# Patient Record
(demographics unavailable — no encounter records)

---

## 2024-10-21 NOTE — HEALTH RISK ASSESSMENT
[Good] : ~his/her~  mood as  good [0] : 2) Feeling down, depressed, or hopeless: Not at all (0) [PHQ-2 Negative - No further assessment needed] : PHQ-2 Negative - No further assessment needed [[id number]] : 0 [Former] : Former [0-4] : 0-4 [> 15 Years] : > 15 Years [Change in mental status noted] : No change in mental status noted

## 2024-10-21 NOTE — ASSESSMENT
[FreeTextEntry1] : thyroid nodules: She follows with Dr Beach.  HCM: Discussed mammogram for breast ca screening. FIT-DNA for colon ca screening. Check labs. Recommended shingrix at the pharmacy.  HTN: BP controlled.on losartan-HCTz.

## 2024-10-28 NOTE — HISTORY OF PRESENT ILLNESS
[de-identified] : 54 yr old female, PCP told her she has wax +hx CI  had a bad sinus infection 6 months ago  wants to make sure she's ok +seasonal allergy

## 2024-10-28 NOTE — PHYSICAL EXAM
[de-identified] : CI AU [de-identified] : pale mucous [Normal] : mucosa is normal [Midline] : trachea located in midline position

## 2024-11-05 NOTE — ASSESSMENT
[FreeTextEntry1] : Reviewed and reconciled medications, allergies, PMHx, PSHx, SocHx, FMHx.   physical exam: right ear: large blood clot/scab inferior portion of ear canal.  suctioned clot clear than use peroxide to further clean out the ear.      Plan: Neomycin-Polymyxin ear drops for next 10 days. Allergic to ofloxacin/ciprodex Follow up in 2 weeks     Case discussed with Dr. Reyes

## 2024-11-05 NOTE — PHYSICAL EXAM
[Normal] : normal appearance, well groomed, well nourished, and in no acute distress [FreeTextEntry8] : large blood clot/scab inferior portion of ear canal

## 2024-11-05 NOTE — CONSULT LETTER
[Dear  ___] : Dear  [unfilled], [Courtesy Letter:] : I had the pleasure of seeing your patient, [unfilled], in my office today. [Please see my note below.] : Please see my note below. [Referral Closing:] : Thank you very much for seeing this patient.  If you have any questions, please do not hesitate to contact me. [Sincerely,] : Sincerely, [FreeTextEntry3] : Trenton Fleming PA-C

## 2024-11-05 NOTE — HISTORY OF PRESENT ILLNESS
[de-identified] : Patient presents as a follow up, patient seen by Dr. Cunningham last week for bilateral cerumen impaction and starting a couple days later from that visit patient had some otalgia and continued clogged ear sensation. She has noticed decreased hearing from the right ear and individuals have noted that she is always asking to repeat themselves. She used ear drops that she had at home without significant relief of her symptoms.

## 2024-11-18 NOTE — ASSESSMENT
[FreeTextEntry1] : Reviewed and reconciled medications, allergies, PMHx, PSHx, SocHx, FMHx.   physical exam: right ear: large blood clot/scab inferior portion of ear canal was removed during this visit. Looks well healed.  Left side of the nose looks like a specific spot which is causing bleeding. Discussed with patient using mupirocin ointment at night for next several days vs cauterization today. Patient opted for mupirocin.     Plan: Mupirocin ointment to lubricate the nose.  Follow up in 6 months with Dr. Cunningham     Case discussed with Dr. Reyes

## 2024-11-18 NOTE — PHYSICAL EXAM
[Normal] : the left mastoid was normal [Hearing Loss Right Only] : normal [Hearing Loss Left Only] : normal [FreeTextEntry8] : large blood clot/scab inferior portion of ear canal

## 2024-11-18 NOTE — HISTORY OF PRESENT ILLNESS
[de-identified] : Patient presents as a follow up, patient seen by Dr. Cunningham last week for bilateral cerumen impaction and starting a couple days later from that visit patient had some otalgia and continued clogged ear sensation. She has noticed decreased hearing from the right ear and individuals have noted that she is always asking to repeat themselves. She used ear drops that she had at home without significant relief of her symptoms.  [FreeTextEntry1] : Patient presents today as follow up to her ear bleeding. States used the drops as instructed. Hasnt noticed further bleeding. She notes blood in her mucous when she blows her nose. Denies any other issues at this time.

## 2024-12-26 NOTE — REVIEW OF SYSTEMS
[Fever] : no fever [Night Sweats] : no night sweats [Chest Pain] : no chest pain [Palpitations] : no palpitations [Lower Ext Edema] : no lower extremity edema [Shortness Of Breath] : no shortness of breath [Wheezing] : no wheezing [Dyspnea on Exertion] : no dyspnea on exertion [Abdominal Pain] : no abdominal pain [Nausea] : no nausea [Vomiting] : no vomiting [Dysuria] : no dysuria

## 2024-12-26 NOTE — PHYSICAL EXAM
[No Acute Distress] : no acute distress [Normal Sclera/Conjunctiva] : normal sclera/conjunctiva [PERRL] : pupils equal round and reactive to light [EOMI] : extraocular movements intact [No Lymphadenopathy] : no lymphadenopathy [Supple] : supple [No Respiratory Distress] : no respiratory distress  [No Accessory Muscle Use] : no accessory muscle use [Clear to Auscultation] : lungs were clear to auscultation bilaterally [Normal Rate] : normal rate  [Regular Rhythm] : with a regular rhythm [Normal S1, S2] : normal S1 and S2 [de-identified] : PND

## 2024-12-26 NOTE — HISTORY OF PRESENT ILLNESS
[FreeTextEntry8] : 54F presents for follow-up of laryngitis. Went to urgent care last week and was given medrol and doxycycline. Reports she went to urgent care before that and COVID, flu, strep were negative. Denies fever, chills, dyspnea. Hoarseness is improving.

## 2025-02-03 NOTE — HISTORY OF PRESENT ILLNESS
[FreeTextEntry8] : 54F presents for one day of right calf/posterior knee pain. Was on an almost 5 hr flight. Denies CP, dyspnea, palpitations.

## 2025-02-03 NOTE — ASSESSMENT
[FreeTextEntry1] : right calf pain: Able to bear weight. Discussed LE doppler of right leg to rule out DVT. Will discuss results.

## 2025-02-03 NOTE — PHYSICAL EXAM
[No Edema] : there was no peripheral edema [No Joint Swelling] : no joint swelling [Grossly Normal Strength/Tone] : grossly normal strength/tone [No Rash] : no rash [de-identified] : mild TTP right posterior knee/proximal calf pain

## 2025-02-03 NOTE — PHYSICAL EXAM
[No Edema] : there was no peripheral edema [No Joint Swelling] : no joint swelling [Grossly Normal Strength/Tone] : grossly normal strength/tone [No Rash] : no rash [de-identified] : mild TTP right posterior knee/proximal calf pain

## 2025-02-03 NOTE — REVIEW OF SYSTEMS
[Joint Pain] : joint pain [Muscle Pain] : muscle pain [Chest Pain] : no chest pain [Palpitations] : no palpitations [Lower Ext Edema] : no lower extremity edema [Shortness Of Breath] : no shortness of breath [Wheezing] : no wheezing [Cough] : no cough [Dyspnea on Exertion] : no dyspnea on exertion [Abdominal Pain] : no abdominal pain [Nausea] : no nausea [Vomiting] : no vomiting [Muscle Weakness] : no muscle weakness

## 2025-03-11 NOTE — HISTORY OF PRESENT ILLNESS
[FreeTextEntry8] : 54F presents for one week of congestion and cough. She went to urgent care where COVID and flu were negative. She was given steroids. Since then, denies fever, dyspnea but cough has gotten worse.  Also reports chronic RUQ pain. Pain is not associated with meals or bowel movements. Denies N/V, blood in stool.

## 2025-03-11 NOTE — PHYSICAL EXAM
[No Acute Distress] : no acute distress [Normal Sclera/Conjunctiva] : normal sclera/conjunctiva [PERRL] : pupils equal round and reactive to light [EOMI] : extraocular movements intact [No Lymphadenopathy] : no lymphadenopathy [Supple] : supple [No Respiratory Distress] : no respiratory distress  [No Accessory Muscle Use] : no accessory muscle use [Clear to Auscultation] : lungs were clear to auscultation bilaterally [Normal Rate] : normal rate  [Regular Rhythm] : with a regular rhythm [Normal S1, S2] : normal S1 and S2 [Soft] : abdomen soft [Non-distended] : non-distended [Normal Bowel Sounds] : normal bowel sounds [de-identified] : mild TTP right lower ribs/RUQ without rebound or guarding

## 2025-03-11 NOTE — REVIEW OF SYSTEMS
[Fever] : no fever [Chills] : no chills [Night Sweats] : no night sweats [Nasal Discharge] : nasal discharge [Sore Throat] : sore throat [Chest Pain] : no chest pain [Palpitations] : no palpitations [Lower Ext Edema] : no lower extremity edema [Shortness Of Breath] : no shortness of breath [Wheezing] : no wheezing [Dyspnea on Exertion] : dyspnea on exertion [Abdominal Pain] : abdominal pain [Nausea] : no nausea [Constipation] : no constipation [Diarrhea] : diarrhea [Vomiting] : no vomiting [Melena] : no melena [Dysuria] : no dysuria

## 2025-03-11 NOTE — ASSESSMENT
[FreeTextEntry1] : URI: Afebrile. Lungs clear. Most likely viral. Start promethazine 6.25mg TID PRN.  Chronic RUQ pain: Check CBC, CMP, ab US. Discussed Ct chest for lower ribs/lung field if normal.

## 2025-03-17 NOTE — PHYSICAL EXAM
[Alert] : alert [No Acute Distress] : no acute distress [Normal Sclera/Conjunctiva] : normal sclera/conjunctiva [EOMI] : extra ocular movement intact [No LAD] : no lymphadenopathy [No Respiratory Distress] : no respiratory distress [Clear to Auscultation] : lungs were clear to auscultation bilaterally [Normal S1, S2] : normal S1 and S2 [Regular Rhythm] : with a regular rhythm [No Edema] : no peripheral edema [Normal Bowel Sounds] : normal bowel sounds [Not Tender] : non-tender [Not Distended] : not distended [Soft] : abdomen soft [Normal Anterior Cervical Nodes] : no anterior cervical lymphadenopathy [No Spinal Tenderness] : no spinal tenderness [No Clubbing, Cyanosis] : no clubbing  or cyanosis of the fingernails [No Rash] : no rash [Normal Reflexes] : deep tendon reflexes were 2+ and symmetric [Normal Affect] : the affect was normal [Normal Mood] : the mood was normal [Kyphosis] : no kyphosis present [de-identified] : left thyroid nodule palpable

## 2025-03-17 NOTE — HISTORY OF PRESENT ILLNESS
[FreeTextEntry1] : 54 y.o. female with h/o thyroid nodules diagnosed in 2013 presents for follow up visit.   Had FNA by [facility] endocrinologist which was benign.   Thyroid ultrasound on 9/23/17 shows right midpole hypoechoic nodule with irregular margins measuring 0.8 cm and stable left lower pole/isthmus spongiform nodule measuring 1.3 cm.   FNA of right mid pole nodule on 10/11/17 was benign.   Thyroid ultrasound on 4/27/19 shows stable right 0.7 cm hypoechoic nodule and stable 1.6 cm left lower pole nodule. No abnormal LNs.  Patient has been euthyroid. No neck complaints. Self-detected on palpation. No head or neck RT exposure.   Feeling good but does report some fatigue. No hair or skin changes. Normal bowel movements. No change in body temperature. Reports weight gain.   Also diagnosed with HTN and was being treated by PCP but now seeing cardiology.   Thyroid ultrasound on 4/19/23 shows right mid pole nodule 0.6 cm with decrease in size, left lower pole nodule 1.5 cm stable and new left mid pole 0.6 cm spongiform nodule and new isthmus 0.8 cm isoechoic nodule.  Thyroid ultrasound on 1/19/24 shows stable right 0.7 cm hypoechoic nodule (TR4), stable isthmus 0.8 cm isoechoic nodule (TR3) and stable left 0.9 cm hypoechoic mid pole nodule (TR4) and left lower pole nodule 1.7 cm slightly increased (TR4).   Had FNA of left lower pole nodule on 2/28/24 shows AUS. Afirma testing was benign.     Also diagnosed in 2002 with PCOS and treated with Metformin originally. Restarted Metformin in June 2021 and was taking 500 mg daily (did not tolerate 1,000 mg daily in the past). Stopped metformin. No acne. No facial hair. Had hysterectomy for heavy bleeding. Trying to limit carbs. No exercise. Reports weight is up since last visit. Hot flashes subsided now. Seeing cardiology now. Had stress test and echo which were okay. She did have CT scan with CAC score of 0 and diagnosed incidental hemangioma in the fall of 2024.   She took Metformin ER 1,000 mg daily for 1 month or so in 2024.  Reports RUQ pain and did have abdominal ultrasound.   In regard to vitamin D def, taking MVI and vitamin D 4,000 Iu daily.   In regard to bone health/osteopenia, no falls or fractures. Takes vitamin D3 4,000 Iu daily. No exercise but does walking.   DEXA scan on 5/17/19 shows spine 0.2, left femoral neck -1.0 and total hip -0.3 and right femoral neck -1.1 with total hip -0.1.  DEXA scan on 4/19/23 shows spine 0.1, left femoral neck -1.4 and total hip -0.3 and right femoral neck -1.7 with total hip -0.7.

## 2025-03-17 NOTE — PHYSICAL EXAM
[Alert] : alert [No Acute Distress] : no acute distress [Normal Sclera/Conjunctiva] : normal sclera/conjunctiva [EOMI] : extra ocular movement intact [No LAD] : no lymphadenopathy [No Respiratory Distress] : no respiratory distress [Clear to Auscultation] : lungs were clear to auscultation bilaterally [Normal S1, S2] : normal S1 and S2 [Regular Rhythm] : with a regular rhythm [No Edema] : no peripheral edema [Normal Bowel Sounds] : normal bowel sounds [Not Tender] : non-tender [Not Distended] : not distended [Soft] : abdomen soft [Normal Anterior Cervical Nodes] : no anterior cervical lymphadenopathy [No Spinal Tenderness] : no spinal tenderness [No Clubbing, Cyanosis] : no clubbing  or cyanosis of the fingernails [No Rash] : no rash [Normal Reflexes] : deep tendon reflexes were 2+ and symmetric [Normal Affect] : the affect was normal [Normal Mood] : the mood was normal [Kyphosis] : no kyphosis present [de-identified] : left thyroid nodule palpable

## 2025-03-17 NOTE — REASON FOR VISIT
[Follow - Up] : a follow-up visit [Thyroid nodule/ MNG] : thyroid nodule/ MNG [PCOS] : PCOS [Other___] : [unfilled]

## 2025-03-17 NOTE — DATA REVIEWED
[FreeTextEntry1] : Thyroid ultrasound 2/15/16 mild heterogenous echotexture right mid pole complex cystic nodule 0.5 cm and right mid/lower pole 0.5 cm cystic nodule left lower pole 1.3 cm isoechoic solid nodule  Labs 4/2019 TSH 2.36 Free T4 1.17 25 vitamin D 30.7  7/1/21 TSH 2.7 Free T4 1.34 H/H 13/39.5 chol 214 tri 54 HDL 85  Hba1c 5.4% 25 vitamin D 38.3  3/29/24 TSH 2.28 T4 7.7 vitamin B12 641 25 vitamin D 51.9 chol 198 HDL 78  tri 58 Hba1c 5.6% CRP 0.1 BUN/cr 13/.69 glucose 98

## 2025-03-17 NOTE — REVIEW OF SYSTEMS
[Recent Weight Gain (___ Lbs)] : recent weight gain: [unfilled] lbs [Negative] : Neurological [Fatigue] : no fatigue [Recent Weight Loss (___ Lbs)] : no recent weight loss [Constipation] : no constipation [Diarrhea] : no diarrhea [Polyuria] : no polyuria [Dry Skin] : no dry skin [Hair Loss] : no hair loss [Polydipsia] : no polydipsia [Cold Intolerance] : no cold intolerance [Hot Flashes] : no hot flashes [Swelling] : no swelling

## 2025-03-17 NOTE — HISTORY OF PRESENT ILLNESS
[FreeTextEntry1] : 54 y.o. female with h/o thyroid nodules diagnosed in 2013 presents for follow up visit.   Had FNA by [facility] endocrinologist which was benign.   Thyroid ultrasound on 9/23/17 shows right midpole hypoechoic nodule with irregular margins measuring 0.8 cm and stable left lower pole/isthmus spongiform nodule measuring 1.3 cm.   FNA of right mid pole nodule on 10/11/17 was benign.   Thyroid ultrasound on 4/27/19 shows stable right 0.7 cm hypoechoic nodule and stable 1.6 cm left lower pole nodule. No abnormal LNs.  Patient has been euthyroid. No neck complaints. Self-detected on palpation. No head or neck RT exposure.   Feeling good but does report some fatigue. No hair or skin changes. Normal bowel movements. No change in body temperature. Reports weight gain.   Also diagnosed with HTN and was being treated by PCP but now seeing cardiology.   Thyroid ultrasound on 4/19/23 shows right mid pole nodule 0.6 cm with decrease in size, left lower pole nodule 1.5 cm stable and new left mid pole 0.6 cm spongiform nodule and new isthmus 0.8 cm isoechoic nodule.  Thyroid ultrasound on 1/19/24 shows stable right 0.7 cm hypoechoic nodule (TR4), stable isthmus 0.8 cm isoechoic nodule (TR3) and stable left 0.9 cm hypoechoic mid pole nodule (TR4) and left lower pole nodule 1.7 cm slightly increased (TR4).   Had FNA of left lower pole nodule on 2/28/24 shows AUS. Afirma testing was benign.     Also diagnosed in 2002 with PCOS and treated with Metformin originally. Restarted Metformin in June 2021 and was taking 500 mg daily (did not tolerate 1,000 mg daily in the past). Stopped metformin. No acne. No facial hair. Had hysterectomy for heavy bleeding. Trying to limit carbs. No exercise. Reports weight is up since last visit. Hot flashes subsided now. Seeing cardiology now. Had stress test and echo which were okay. She did have CT scan with CAC score of 0 and diagnosed incidental hemangioma in the fall of 2024.   She took Metformin ER 1,000 mg daily for 1 month or so in 2024.  Reports RUQ pain and did have abdominal ultrasound.   In regard to vitamin D def, taking MVI and vitamin D 4,000 Iu daily.   In regard to bone health/osteopenia, no falls or fractures. Takes vitamin D3 4,000 Iu daily. No exercise but does walking.   DEXA scan on 5/17/19 shows spine 0.2, left femoral neck -1.0 and total hip -0.3 and right femoral neck -1.1 with total hip -0.1.  DEXA scan on 4/19/23 shows spine 0.1, left femoral neck -1.4 and total hip -0.3 and right femoral neck -1.7 with total hip -0.7.

## 2025-03-17 NOTE — ASSESSMENT
[FreeTextEntry1] : 54 y.o. female with h/o thyroid nodules, PCOS, vitamin D def, osteopenia and HTN.  1. Thyroid nodules- Reviewed pathophysiology. Since nodules have remained stable in size and appearance and FNA of right mid pole and left lower pole nodules were benign, will continue to monitor. Will recheck thyroid ultrasound in April 2025. Patient is euthyroid.     2. PCOS with obesity- Discussed pathophysiology and increased risk of Type 2 DM. Encouraged a carbohydrate consistent diet and exercise. Normal Hba1c. Will start Metformin  mg daily for now and titrate up if tolerating to 2,000 mg daily. Currently postmenopausal. Discussed medical therapy such as GLP-1 agonists like Wegovy. She is not interested in GLP-1 at this time.   3. Vitamin D def- Near normal 25 vitamin D level and will continue supplement.  4. Osteopenia- DEXA scan from April 2023 was reviewed. Risk of fracture is average. Encouraged weight bearing activity. Discussed appropriate calcium and vitamin D intake. Will monitor for now and repeat DEXA scan in April 2025.   5. HTN- BP is at goal and will continue current medications  Follow up in 1 year if stable Recommend follow up with PCP and GI for RUQ pain

## 2025-03-28 NOTE — HISTORY OF PRESENT ILLNESS
[de-identified] : Patient presents as a follow up, patient seen by Dr. Cunningham last week for bilateral cerumen impaction and starting a couple days later from that visit patient had some otalgia and continued clogged ear sensation. She has noticed decreased hearing from the right ear and individuals have noted that she is always asking to repeat themselves. She used ear drops that she had at home without significant relief of her symptoms.  11/18/24: Patient presents today as follow up to her ear bleeding. States used the drops as instructed. Hasn't noticed further bleeding. She notes blood in her mucous when she blows her nose. Denies any other issues at this time.  [FreeTextEntry1] : 3/28/25: Patient presents as a follow up. She notes went to see pcp who advised patient to follow up with ent regarding previous bleeding from the ear. She also notes had an upper respiratory infection which was treated with steroids and cough medication. She notes all viral testing was negative at that time. She notes some nasal congestion which is unsure is due to recent uri or due to her sinuses.

## 2025-03-28 NOTE — ASSESSMENT
[FreeTextEntry1] : Reviewed and reconciled medications, allergies, PMHx, PSHx, SocHx, FMHx.   physical exam: right ear:  Looks well healed. no cerumen left ear: cerumen removed via curettage  inflamed turbinates deviated septum thyroid noduless   Procedure:  Flexible Nasal Endoscopy: Risks, benefits, and alternatives of flexible endoscopy were explained to the patient. The patient gave oral consent to proceed.  The flexible scope was inserted into the right nasal cavity.  Endoscopy of the inferior and middle meatus was performed.  No polyp, mass, or lesion was appreciated.  Olfactory cleft was clear. Spheno-ethmoid recess is clear. Nasopharynx was clear.  Turbinates were without mass.   The procedure was repeated on the contralateral side with similar findings. no signs of sinus infection  Plan: xyzal for allergic rhinitis Mupirocin ointment to lubricate the nose.  Follow up in 6 months     Case discussed with Dr. Reyes

## 2025-03-28 NOTE — PHYSICAL EXAM
[] : septum deviated bilaterally [Midline] : trachea located in midline position [Normal] : no rashes [Hearing Loss Right Only] : normal [Hearing Loss Left Only] : normal [FreeTextEntry8] : clean and clear. Well healed [FreeTextEntry9] : cerumen removed via curettage [de-identified] : inflamed turbinates [FreeTextEntry2] : sinuses non tender

## 2025-03-28 NOTE — PROCEDURE
[Recalcitrant Symptoms] : recalcitrant symptoms  [Anterior rhinoscopy insufficient to account for symptoms] : anterior rhinoscopy insufficient to account for symptoms